# Patient Record
Sex: FEMALE | Race: WHITE | NOT HISPANIC OR LATINO | Employment: OTHER | URBAN - METROPOLITAN AREA
[De-identification: names, ages, dates, MRNs, and addresses within clinical notes are randomized per-mention and may not be internally consistent; named-entity substitution may affect disease eponyms.]

---

## 2022-04-12 ENCOUNTER — HOSPITAL ENCOUNTER (OUTPATIENT)
Dept: CT IMAGING | Facility: HOSPITAL | Age: 87
Discharge: HOME | End: 2022-04-12
Payer: COMMERCIAL

## 2022-04-12 ENCOUNTER — APPOINTMENT (OUTPATIENT)
Dept: LAB | Facility: HOSPITAL | Age: 87
End: 2022-04-12
Payer: COMMERCIAL

## 2022-04-12 ENCOUNTER — OFFICE VISIT (OUTPATIENT)
Dept: FAMILY MEDICINE CLINIC | Facility: CLINIC | Age: 87
End: 2022-04-12
Payer: COMMERCIAL

## 2022-04-12 VITALS
WEIGHT: 177 LBS | HEART RATE: 84 BPM | BODY MASS INDEX: 34.75 KG/M2 | SYSTOLIC BLOOD PRESSURE: 128 MMHG | DIASTOLIC BLOOD PRESSURE: 84 MMHG | HEIGHT: 60 IN

## 2022-04-12 DIAGNOSIS — H02.401 PTOSIS OF RIGHT EYELID: ICD-10-CM

## 2022-04-12 DIAGNOSIS — E78.2 MIXED HYPERLIPIDEMIA: ICD-10-CM

## 2022-04-12 DIAGNOSIS — H53.2 DIPLOPIA: ICD-10-CM
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
## 2022-04-12 DIAGNOSIS — H51.8 DYSCONJUGATE GAZE: ICD-10-CM

## 2022-04-12 DIAGNOSIS — I10 PRIMARY HYPERTENSION: ICD-10-CM

## 2022-04-12 DIAGNOSIS — H02.401 PTOSIS OF RIGHT EYELID: Primary | ICD-10-CM

## 2022-04-12 DIAGNOSIS — F41.9 ANXIETY: ICD-10-CM

## 2022-04-12 PROBLEM — H02.409 PTOSIS: Status: ACTIVE | Noted: 2022-01-01

## 2022-04-12 PROBLEM — E78.5 HYPERLIPIDEMIA: Status: ACTIVE | Noted: 2022-04-12

## 2022-04-12 LAB
ALBUMIN SERPL BCP-MCNC: 3.8 G/DL (ref 3.5–5)
ALP SERPL-CCNC: 83 U/L (ref 46–116)
ALT SERPL W P-5'-P-CCNC: 38 U/L (ref 12–78)
ANION GAP SERPL CALCULATED.3IONS-SCNC: 5 MMOL/L (ref 4–13)
AST SERPL W P-5'-P-CCNC: 18 U/L (ref 5–45)
BASOPHILS # BLD AUTO: 0.07 THOUSANDS/ΜL (ref 0–0.1)
BASOPHILS NFR BLD AUTO: 1 % (ref 0–1)
BILIRUB SERPL-MCNC: 0.46 MG/DL (ref 0.2–1)
BUN SERPL-MCNC: 26 MG/DL (ref 5–25)
CALCIUM SERPL-MCNC: 9.3 MG/DL (ref 8.3–10.1)
CHLORIDE SERPL-SCNC: 102 MMOL/L (ref 100–108)
CHOLEST SERPL-MCNC: 203 MG/DL
CO2 SERPL-SCNC: 30 MMOL/L (ref 21–32)
CREAT SERPL-MCNC: 0.95 MG/DL (ref 0.6–1.3)
EOSINOPHIL # BLD AUTO: 0 THOUSAND/ΜL (ref 0–0.61)
EOSINOPHIL NFR BLD AUTO: 0 % (ref 0–6)
ERYTHROCYTE [DISTWIDTH] IN BLOOD BY AUTOMATED COUNT: 13.7 % (ref 11.6–15.1)
GFR SERPL CREATININE-BSD FRML MDRD: 55 ML/MIN/1.73SQ M
GLUCOSE SERPL-MCNC: 86 MG/DL (ref 65–140)
HCT VFR BLD AUTO: 44 % (ref 34.8–46.1)
HDLC SERPL-MCNC: 76 MG/DL
HGB BLD-MCNC: 14.7 G/DL (ref 11.5–15.4)
IMM GRANULOCYTES # BLD AUTO: 0.06 THOUSAND/UL (ref 0–0.2)
IMM GRANULOCYTES NFR BLD AUTO: 1 % (ref 0–2)
LDLC SERPL CALC-MCNC: 111 MG/DL (ref 0–100)
LYMPHOCYTES # BLD AUTO: 2.11 THOUSANDS/ΜL (ref 0.6–4.47)
LYMPHOCYTES NFR BLD AUTO: 27 % (ref 14–44)
MCH RBC QN AUTO: 28.5 PG (ref 26.8–34.3)
MCHC RBC AUTO-ENTMCNC: 33.4 G/DL (ref 31.4–37.4)
MCV RBC AUTO: 85 FL (ref 82–98)
MONOCYTES # BLD AUTO: 0.68 THOUSAND/ΜL (ref 0.17–1.22)
MONOCYTES NFR BLD AUTO: 9 % (ref 4–12)
NEUTROPHILS # BLD AUTO: 4.86 THOUSANDS/ΜL (ref 1.85–7.62)
NEUTS SEG NFR BLD AUTO: 62 % (ref 43–75)
NRBC BLD AUTO-RTO: 0 /100 WBCS
PLATELET # BLD AUTO: 292 THOUSANDS/UL (ref 149–390)
PMV BLD AUTO: 9.6 FL (ref 8.9–12.7)
POTASSIUM SERPL-SCNC: 4.8 MMOL/L (ref 3.5–5.3)
PROT SERPL-MCNC: 7.6 G/DL (ref 6.4–8.2)
RBC # BLD AUTO: 5.15 MILLION/UL (ref 3.81–5.12)
SODIUM SERPL-SCNC: 137 MMOL/L (ref 136–145)
TRIGL SERPL-MCNC: 79 MG/DL
TSH SERPL DL<=0.05 MIU/L-ACNC: 0.9 UIU/ML (ref 0.45–4.5)
WBC # BLD AUTO: 7.78 THOUSAND/UL (ref 4.31–10.16)

## 2022-04-12 PROCEDURE — 36415 COLL VENOUS BLD VENIPUNCTURE: CPT

## 2022-04-12 PROCEDURE — 3288F FALL RISK ASSESSMENT DOCD: CPT | Performed by: FAMILY MEDICINE

## 2022-04-12 PROCEDURE — 99204 OFFICE O/P NEW MOD 45 MIN: CPT | Performed by: FAMILY MEDICINE

## 2022-04-12 PROCEDURE — 80061 LIPID PANEL: CPT

## 2022-04-12 PROCEDURE — 80053 COMPREHEN METABOLIC PANEL: CPT

## 2022-04-12 PROCEDURE — 70486 CT MAXILLOFACIAL W/O DYE: CPT

## 2022-04-12 PROCEDURE — 85025 COMPLETE CBC W/AUTO DIFF WBC: CPT

## 2022-04-12 PROCEDURE — 84443 ASSAY THYROID STIM HORMONE: CPT

## 2022-04-12 PROCEDURE — G1004 CDSM NDSC: HCPCS

## 2022-04-12 PROCEDURE — 1160F RVW MEDS BY RX/DR IN RCRD: CPT | Performed by: FAMILY MEDICINE

## 2022-04-12 PROCEDURE — 1100F PTFALLS ASSESS-DOCD GE2>/YR: CPT | Performed by: FAMILY MEDICINE

## 2022-04-12 PROCEDURE — 1036F TOBACCO NON-USER: CPT | Performed by: FAMILY MEDICINE

## 2022-04-12 PROCEDURE — 3725F SCREEN DEPRESSION PERFORMED: CPT | Performed by: FAMILY MEDICINE

## 2022-04-12 RX ORDER — AMLODIPINE BESYLATE 2.5 MG/1
2.5 TABLET ORAL DAILY
Start: 2022-04-12

## 2022-04-12 RX ORDER — CITALOPRAM 20 MG/1
20 TABLET ORAL DAILY
COMMUNITY

## 2022-04-12 RX ORDER — ATORVASTATIN CALCIUM 40 MG/1
40 TABLET, FILM COATED ORAL DAILY
COMMUNITY

## 2022-04-12 RX ORDER — AMLODIPINE BESYLATE 5 MG/1
5 TABLET ORAL DAILY
COMMUNITY
End: 2022-04-12 | Stop reason: SDUPTHER

## 2022-04-12 RX ORDER — MULTIVITAMIN
1 CAPSULE ORAL DAILY
COMMUNITY

## 2022-04-12 RX ORDER — VITAMIN E 268 MG
400 CAPSULE ORAL DAILY
COMMUNITY

## 2022-04-12 RX ORDER — VITAMIN B COMPLEX
1 CAPSULE ORAL DAILY
COMMUNITY

## 2022-04-12 NOTE — PATIENT INSTRUCTIONS
Ptosis   AMBULATORY CARE:   Ptosis  is the drooping of one or both eyelids  It may affect your vision  You may tilt your head back to see underneath the drooping eyelid  You may also try to lift your eyelids by raising your eyebrows  Contact your healthcare provider for the following symptoms:   · Worsening vision    · New symptoms    · Questions or concerns about your condition or care    Treatment for ptosis  may not be needed if your ptosis is mild  If your ptosis affects your vision, you may need surgery to tighten your levator muscle or to reattach it  If your levator muscle is too weak, your eyelid may be attached to or suspended from the area under your eyebrow  This will allow your forehead muscles to do the work of lifting your eyelid  Follow up with your doctor as directed:  Write down your questions so you remember to ask them during your visits  © [employer] 2022 Information is for End User's use only and may not be sold, redistributed or otherwise used for commercial purposes  All illustrations and images included in CareNotes® are the copyrighted property of A D A M , Inc  or [address] [place], [place] [address]carmen   The above information is an  only  It is not intended as medical advice for individual conditions or treatments  Talk to your doctor, nurse or pharmacist before following any medical regimen to see if it is safe and effective for you  Problem List Items Addressed This Visit     Anxiety     Patient has had a significant amount of anxiety overall  She has been using Celexa 20  At this time, I will not make any changes  Check labs, follow-up in the near future  Negative SI, positive contract  Relevant Orders    TSH, 3rd generation    CBC and differential    Diplopia     Patient is having diplopia, but she also has dysconjugate gaze  This brings up the possibility of muscular abnormalities such as myasthenia, verses tumor  Check labs, CT face, MRI brain    CT the face as she has had multiple falls as well, therefore I would like to check see if there is a facial fracture  Relevant Orders    TSH, 3rd generation    Comprehensive metabolic panel    CT facial bones wo contrast    MRI brain w wo contrast    Dysconjugate gaze     Patient was not able have normal extraocular muscle movements today on exam   The right eye was not staying in time with the left  This is also where the tumor changes may be, as well as the ptosis and issues that she appears to be having with diplopia  Studies as ordered  Relevant Orders    TSH, 3rd generation    CT facial bones wo contrast    MRI brain w wo contrast    Falls     Patient has had multiple falls  Question cause  Check labs  Need to rule out CVA, tumor  May be related to eyes, but could also be other  Given that she has amlodipine for blood pressure, will check orthostatics as well  Relevant Orders    CT facial bones wo contrast    MRI brain w wo contrast    Hyperlipidemia     Patient has history of hyperlipidemia  Recommend check labs  Follow-up afterwards  Continue on atorvastatin for the time being  She is also on Co Q10  Relevant Medications    atorvastatin (LIPITOR) 40 mg tablet    Other Relevant Orders    Comprehensive metabolic panel    Lipid Panel with Direct LDL reflex    Hypertension     When patient went from lying to sitting or standing, her blood pressure did drop  With that, would recommend that he decrease the amlodipine to 2 5 mg   I did ask if she felt any dizziness or lightheadedness when happened, and her reply was I did it slowly           Relevant Medications    amLODIPine (NORVASC) 2 5 mg tablet    Other Relevant Orders    TSH, 3rd generation    CBC and differential    Comprehensive metabolic panel    Ptosis - Primary     Patient clearly has significant ptosis of the right eye  At this point, I would check labs, CT of the face, MRI of the brain    Will need to rule mass verses other issues  Relevant Orders    TSH, 3rd generation    Comprehensive metabolic panel    CT facial bones wo contrast    MRI brain w wo contrast            COVID 19 Instructions    Susanna Carter was advised to limit contact with others to essential tasks such as getting food, medications, and medical care  Proper handwashing reviewed, and Hand sanitzer when washing is not available  If the patient develops symptoms of COVID 19, the patient should call the office as soon as possible  For 5712-7833 Flu season, it is strongly recommended that Flu Vaccinations be obtained  Virtual Visits:  Jerrell: This works on smart phones (any phone with Internet browsing capability)  You should get a text message when the provider is ready to see you  Click on the link in the text message, and the call should start  You will need to type in your name, and allow camera and microphone access  This is HIPPA compliant, and secure  If you have not already done so, get immunized to COVID 19  We are committed to getting you vaccinated as soon as possible and will be closely following CDC and [facility]  guidelines as they are released and revised  Please refer to our COVID-19 vaccine webpage for the most up to date information on the vaccine and our distribution efforts  This site will also have the most up to date recommendations for COVID booster vaccine  Sweta casey    Call [phone] ([phone]), option 7    OUR NEW LOCATION:    [address], [address] [place] [address] [address], [place], [address]  Fax: [phone]    Lab services and OB/GYN are at this location as well

## 2022-04-12 NOTE — ASSESSMENT & PLAN NOTE
Patient has had multiple falls  Question cause  Check labs  Need to rule out CVA, tumor  May be related to eyes, but could also be other  Given that she has amlodipine for blood pressure, will check orthostatics as well

## 2022-04-12 NOTE — ASSESSMENT & PLAN NOTE
When patient went from lying to sitting or standing, her blood pressure did drop  With that, would recommend that he decrease the amlodipine to 2 5 mg   I did ask if she felt any dizziness or lightheadedness when happened, and her reply was I did it slowly  

## 2022-04-12 NOTE — ASSESSMENT & PLAN NOTE
Patient has had a significant amount of anxiety overall  She has been using Celexa 20  At this time, I will not make any changes  Check labs, follow-up in the near future  Negative SI, positive contract

## 2022-04-12 NOTE — ASSESSMENT & PLAN NOTE
Patient clearly has significant ptosis of the right eye  At this point, I would check labs, CT of the face, MRI of the brain  Will need to rule mass verses other issues

## 2022-04-12 NOTE — PROGRESS NOTES
Assessment and Plan:    Problem List Items Addressed This Visit     Anxiety     Patient has had a significant amount of anxiety overall  She has been using Celexa 20  At this time, I will not make any changes  Check labs, follow-up in the near future  Negative SI, positive contract  Relevant Orders    TSH, 3rd generation    CBC and differential    Diplopia     Patient is having diplopia, but she also has dysconjugate gaze  This brings up the possibility of muscular abnormalities such as myasthenia, verses tumor  Check labs, CT face, MRI brain  CT the face as she has had multiple falls as well, therefore I would like to check see if there is a facial fracture  Relevant Orders    TSH, 3rd generation    Comprehensive metabolic panel    CT facial bones wo contrast    MRI brain w wo contrast    Dysconjugate gaze     Patient was not able have normal extraocular muscle movements today on exam   The right eye was not staying in time with the left  This is also where the tumor changes may be, as well as the ptosis and issues that she appears to be having with diplopia  Studies as ordered  Relevant Orders    TSH, 3rd generation    CT facial bones wo contrast    MRI brain w wo contrast    Falls     Patient has had multiple falls  Question cause  Check labs  Need to rule out CVA, tumor  May be related to eyes, but could also be other  Given that she has amlodipine for blood pressure, will check orthostatics as well  Relevant Orders    CT facial bones wo contrast    MRI brain w wo contrast    Hyperlipidemia     Patient has history of hyperlipidemia  Recommend check labs  Follow-up afterwards  Continue on atorvastatin for the time being  She is also on Co Q10           Relevant Medications    atorvastatin (LIPITOR) 40 mg tablet    Other Relevant Orders    Comprehensive metabolic panel    Lipid Panel with Direct LDL reflex    Hypertension     When patient went from lying to sitting or standing, her blood pressure did drop  With that, would recommend that he decrease the amlodipine to 2 5 mg   I did ask if she felt any dizziness or lightheadedness when happened, and her reply was I did it slowly           Relevant Medications    amLODIPine (NORVASC) 2 5 mg tablet    Other Relevant Orders    TSH, 3rd generation    CBC and differential    Comprehensive metabolic panel    Ptosis - Primary     Patient clearly has significant ptosis of the right eye  At this point, I would check labs, CT of the face, MRI of the brain  Will need to rule mass verses other issues  Relevant Orders    TSH, 3rd generation    Comprehensive metabolic panel    CT facial bones wo contrast    MRI brain w wo contrast                 Diagnoses and all orders for this visit:    Ptosis of right eyelid  -     TSH, 3rd generation; Future  -     Comprehensive metabolic panel; Future  -     CT facial bones wo contrast; Future  -     MRI brain w wo contrast; Future    Diplopia  -     TSH, 3rd generation; Future  -     Comprehensive metabolic panel; Future  -     CT facial bones wo contrast; Future  -     MRI brain w wo contrast; Future    Primary hypertension  -     TSH, 3rd generation; Future  -     CBC and differential; Future  -     Comprehensive metabolic panel; Future  -     amLODIPine (NORVASC) 2 5 mg tablet; Take 1 tablet (2 5 mg total) by mouth daily    Mixed hyperlipidemia  -     Comprehensive metabolic panel; Future  -     Lipid Panel with Direct LDL reflex; Future    Anxiety  -     TSH, 3rd generation; Future  -     CBC and differential; Future    Dysconjugate gaze  -     TSH, 3rd generation; Future  -     CT facial bones wo contrast; Future  -     MRI brain w wo contrast; Future    Fall, initial encounter  -     CT facial bones wo contrast; Future  -     MRI brain w wo contrast; Future    Other orders  -     citalopram (CeleXA) 20 mg tablet;  Take 20 mg by mouth daily  -     Discontinue: amLODIPine (NORVASC) 5 mg tablet; Take 5 mg by mouth daily  -     atorvastatin (LIPITOR) 40 mg tablet; Take 40 mg by mouth daily  -     Multiple Vitamin (multivitamin) capsule; Take 1 capsule by mouth daily  -     b complex vitamins capsule; Take 1 capsule by mouth daily  -     co-enzyme Q-10 30 MG capsule; Take 30 mg by mouth 3 (three) times a day  -     vitamin E, tocopherol, 400 units capsule; Take 400 Units by mouth daily              Subjective:      Patient ID: Ej Welsh is a 82 y o  female  CC:    Chief Complaint   Patient presents with   [address]     NEW patient is here to get established  Patient is having difficulty with her vision in her right eye, eye dr diagnosed with Diplopia x 2 weeks, Ptosis  ak       HPI:    Patient here to [address] [place] [address]  She is having some issues with double vision  Has had some blurred vision as well  Patient did go to optometrist   They recommended further evaluation  This is because of sudden onset of diplopia, and ptosis  She had cataract on right, and since had problems in the right eye  Notes some swelling in the face as well  Has been for a bit with this  "Many years "    Dizziness as well  She stood up quick, and noted spinning and then needed to sit  Has had several falls with this, "loose my balance just like that "    Patient does have hypertension  She is currently on amlodipine 5 mg daily  She reports she has been on this for approximately 10 years  Hyperlipidemia:  Currently on atorvastatin  She has been that for approximately 15 years per    Anxiety:  Patient reports he has significant amount of anxiety  She gets frequently panic symptoms  Has difficulty calming herself down at times  She is currently on Celexa at 20 mg          The following portions of the patient's history were reviewed and updated as appropriate: allergies, current medications, past family history, past medical history, past social history, past surgical history and problem list       Review of Systems   Constitutional: Negative  HENT: Positive for congestion and sinus pressure  Eyes: Positive for photophobia (od), pain (Started about a month ago ) and visual disturbance  Respiratory: Positive for shortness of breath and wheezing  Cardiovascular: Positive for palpitations  Negative for chest pain and leg swelling  Gastrointestinal: Negative  Endocrine: Negative  Genitourinary: Negative  Musculoskeletal: Positive for arthralgias and back pain  Skin: Negative  Allergic/Immunologic: Negative  Neurological: Positive for dizziness and light-headedness  Negative for speech difficulty  Hematological: Negative  Psychiatric/Behavioral: Negative for suicidal ideas  The patient is nervous/anxious            Data to review:       Objective:    Vitals:    04/12/22 1607 04/12/22 1709 04/12/22 1710   BP: 144/84 120/84 128/84   BP Location: Right arm     Patient Position: Prone     Pulse: 84     Weight: 80 3 kg (177 lb)     Height: 4' 11 7" (1 516 m)          Physical Exam

## 2022-04-12 NOTE — ASSESSMENT & PLAN NOTE
Patient was not able have normal extraocular muscle movements today on exam   The right eye was not staying in time with the left  This is also where the tumor changes may be, as well as the ptosis and issues that she appears to be having with diplopia  Studies as ordered

## 2022-04-12 NOTE — ASSESSMENT & PLAN NOTE
Patient has history of hyperlipidemia  Recommend check labs  Follow-up afterwards  Continue on atorvastatin for the time being  She is also on Co Q10

## 2022-04-13 ENCOUNTER — TELEPHONE (OUTPATIENT)
Dept: FAMILY MEDICINE CLINIC | Facility: CLINIC | Age: 87
End: 2022-04-13

## 2022-04-13 PROBLEM — H05.89 ORBITAL MASS: Status: ACTIVE | Noted: 2022-01-01

## 2022-04-13 PROBLEM — H05.20 PROPTOSIS: Status: ACTIVE | Noted: 2022-01-01

## 2022-04-13 NOTE — TELEPHONE ENCOUNTER
Pt's granddaughter is calling in; they did schedule the mri brain and orbits wo and w for 4/25 at [facility]; this was the first available apt for this kind of MRI  Pt's granddaughter is asking if Dr Gladys Dumont can possibly change this to a stat mri? So they can try to get in sooner? Please call her back at [phone]  Thank you

## 2022-04-14 NOTE — TELEPHONE ENCOUNTER
Spoke w/pt granddaughter  pt is having some discomfort, she understood dr joel wasn't unable to change MRI order to ASAP and they going to keep the MRI appt that is already scheduled

## 2022-04-15 ENCOUNTER — TELEPHONE (OUTPATIENT)
Dept: FAMILY MEDICINE CLINIC | Facility: CLINIC | Age: 87
End: 2022-04-15

## 2022-04-15 ENCOUNTER — HOSPITAL ENCOUNTER (OUTPATIENT)
Dept: MRI IMAGING | Facility: HOSPITAL | Age: 87
Discharge: HOME | End: 2022-04-15
Payer: COMMERCIAL

## 2022-04-15 DIAGNOSIS — H05.20 PROPTOSIS: ICD-10-CM

## 2022-04-15 DIAGNOSIS — H05.89 ORBITAL MASS: ICD-10-CM

## 2022-04-15 PROCEDURE — A9585 GADOBUTROL INJECTION: HCPCS | Performed by: FAMILY MEDICINE

## 2022-04-15 PROCEDURE — 70543 MRI ORBT/FAC/NCK W/O &W/DYE: CPT

## 2022-04-15 PROCEDURE — G1004 CDSM NDSC: HCPCS

## 2022-04-15 PROCEDURE — 70553 MRI BRAIN STEM W/O & W/DYE: CPT

## 2022-04-15 RX ADMIN — GADOBUTROL 8 ML: 604.72 INJECTION INTRAVENOUS at 10:54

## 2022-04-15 NOTE — TELEPHONE ENCOUNTER
Reviewed the MRI results with her son  Reviewed that she has an appointment on Tuesday with ENT  Further recommendations will be per ENT and Surgical teams

## 2022-04-15 NOTE — TELEPHONE ENCOUNTER
Patient son Wilian Keller called in to get MRI results, per son can provider review results and nurse call him with results, please call aye Keller first before patient

## 2022-04-15 NOTE — TELEPHONE ENCOUNTER
Son stated to [facility] he wants more clarification on his mothers MRI and does not want to bring her in if he doesn't have to

## 2022-04-18 ENCOUNTER — TELEPHONE (OUTPATIENT)
Dept: FAMILY MEDICINE CLINIC | Facility: CLINIC | Age: 87
End: 2022-04-18

## 2022-04-18 NOTE — TELEPHONE ENCOUNTER
PT'S STAT CT FACIAL BONES WO CONTRAST WAS DENIED BY THE INSURANCE  IS DR BRAND WILLING TO DO A PEER TO PEER TO HAVE THIS POSSIBLY APPROVED?          CT DENIED Jed Menendez Case #[id number] Jed Ovidioesthela recommended CPT 16143 but patient was already scanned for STAT 61843 / Sent IB requesting P2P to [facility] PRIMARY CARE Clinical Team     Authorization Number:            NA  Case Number: [id number]                  Status: Denied  P2P Status:          Approval Date:              Service Code: [id number]  Service Description:    Mary Alice Cannon 541 W/O CONTRAST  Site Name:      [facility] [place]  Expiration Date:            Date Last Updated:     4/14/2022 8:27:29 AM  Correspondence:           Procedures  Procedure       Description      Magnolia Reynolds Requested            Qty Approved  Modifier(s)  17051  Computed tomography (CT), a special kind of picture of your maxillofacial area (sinus) wi  thout contrast (dye)            1

## 2022-04-18 NOTE — TELEPHONE ENCOUNTER
OK for prior auth call  I did use ACR criteria for this, and it was approved under ACR criteria  Concern was orbital fx and entrapment of the muscles of the eye, as she has fallen frequently, and had diplopia, proptosis, and blurry vision after that

## 2022-04-19 NOTE — H&P (VIEW-ONLY)
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  We discussed extensively the imaging and reviewed with her as well as with radiology  We discussed that 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 cavity as well as throughout both paranasal sinus in the ethmoids and right frontal sinus  She does have orbital involvement as well  Discussed with my colleague Dr Liu Felix and evaluated the images with him  We discussed that this will require likely complex management with neurosurgical involvement, ENT involvement, and possible medical or radiation oncology involvement  We discussed her age and overall health and that initial workup will start with a nasal sinus mass biopsy to determine the nature of the mass  She was poorly tolerant of the nasal endoscopy due to anxiety  We will plan for operative biopsy and follow-up shortly thereafter  Once we have established pathology we will refer her to my colleague Dr Angela Burnham should she wish surgical management or radiation And/or medical oncology for nonsurgical management  Discussed RBA of nasal endoscopy and biopsy including risk of bleeding, CSF leak and lack of diagnostic information and need for further procedures  History of Present Illness   Physician Requesting Consult: Edison Vigil MD   Reason for Consult / Principal Problem: Orbital mass  HPI: Karmen Cuellar is a 82y o  year old female who presents with Long history of orbital changes    She was seen by her ophthalmologist previously when she noted some degree of proptosis  She has noted a long-term history of right-sided neck and temple pain lasting several years  She was thought to have an orbital mass and her ophthalmologist referred her for an MRI  She was lost to follow-up at that time and her son who acts as an adjunct historian moved her to the area  She was seen by her new PCP, Dr Michael Granados who immediately obtained a CT scan followed by an MRI which demonstrated a 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 extending into the frontal sinus on the right across midline to the ethmoid sinuses on the left and through the skull base into the intracranial cavity  Notably there is some dural enhancement on the MRI and Likely reactive sphenoid sinusitis on the CT scan  Review of systems:  10 Point ROS was performed and negative except as above or otherwise noted in the medical record  Historical Information   Past Medical History:   Diagnosis Date    Anemia     Anxiety     Arthritis     Hypertension      Past Surgical History:   Procedure Laterality Date    CATARACT EXTRACTION Bilateral     Left was OK, right with vision loss after      TONSILECTOMY AND ADNOIDECTOMY      TOTAL HIP ARTHROPLASTY Bilateral     WISDOM TOOTH EXTRACTION       Social History   Social History     Substance and Sexual Activity   Alcohol Use Yes    Comment: occasional     Social History     Substance and Sexual Activity   Drug Use Never     Social History     Tobacco Use   Smoking Status Never Smoker   Smokeless Tobacco Never Used     Family History:   Family History   Adopted: Yes   Problem Relation Age of Onset    Hypertension Family     Arthritis Family        Current Outpatient Medications on File Prior to Visit   Medication Sig    amLODIPine (NORVASC) 2 5 mg tablet Take 1 tablet (2 5 mg total) by mouth daily    atorvastatin (LIPITOR) 40 mg tablet Take 40 mg by mouth daily    citalopram (CeleXA) 20 mg tablet Take 20 mg by mouth daily    b complex vitamins capsule Take 1 capsule by mouth daily    co-enzyme Q-10 30 MG capsule Take 30 mg by mouth 3 (three) times a day    Multiple Vitamin (multivitamin) capsule Take 1 capsule by mouth daily    vitamin E, tocopherol, 400 units capsule Take 400 Units by mouth daily     No current facility-administered medications on file prior to visit  Allergies   Allergen Reactions    Sm Non-Asprin Sinus [Pseudoephedrine-Acetaminophen] Anaphylaxis    Prednisone Edema    Penicillins Rash       Vitals:    04/19/22 1102   Temp: (!) 97 °F (36 1 °C)       Physical Exam   Constitutional: Oriented to person, place, and time  Well-developed and well-nourished, no apparent distress, non-toxic appearance  Cooperative, able to hear and answer questions without difficulty  Voice: Normal voice quality  Head: Normocephalic, atraumatic  No scars, masses or lesions  Face: Symmetric, no edema, no sinus tenderness  Eyes: Vision grossly intact, extra-ocular movement intact  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 and diplopia  She compensates by turning her head to the right and inferior slightly resulting in right SCM and trapezius tension  Ears: External ears normal  Tympanic membranes intact with intact normal landmarks  No post-auricular erythema or tenderness  Nose: Septum intact, nares clear  Mucosa moist, turbinates well appearing  No crusting, polyps or discharge evident  Oral cavity: Dentition intact  Mucosa moist, lips without lesions or masses  Tongue mobile, floor of mouth soft and flat  Hard palate intact  No masses or lesions  Oropharynx: Uvula is midline, soft palate intact without lesion or mass  Oropharyngeal inlet without obstruction  Tonsils unremarkable  Posterior pharyngeal wall clear  No masses or lesions    Salivary glands:  Parotid glands and submandibular glands symmetric, no enlargement or tenderness  Neck: Normal laryngeal elevation with swallow  Trachea midline  No masses or lesions  No palpable adenopathy  Thyroid: Without tenderness or palpable nodules  Pulmonary/Chest: Normal effort and rate  No respiratory distress  No stertor or stridor  Musculoskeletal: Normal range of motion  Neurological: Cranial nerves 2-12 intact  Skin: Skin is warm and dry  Psychiatric: Normal mood and affect  Procedure:  Nasal endoscopy    Indications:  Evaluation for sinusitis    Procedure in detail:  After informed verbal consent was obtained from the patient bilateral nares were anesthetized with 1% lidocaine and oxymetazoline spray  After adequate time for anesthesia patient's bilateral nasal cavities, middle meatus, and sphenoethmoid recesses were evaluated with the following findings  Patient tolerated the procedure well without complications  Findings:   No significant mucoid purulence or polyposis seen in bilateral nasal cavities  There is mild right septal deviation and moderate congestion  She was poorly tolerant of the exam        Imaging Studies: I have personally reviewed pertinent reports   , I have personally reviewed pertinent films in PACS with a Radiologist  and Reviewed with Drs Manon Sever, and Berta as well  Lab Results: I have personally reviewed pertinent lab results  Records reviewed: Dr Vivian Miranda records reviewed  Over 1/2 of the 60 minute visit was spent in medical-decision making, counseling, and coordination of care

## 2022-04-22 ENCOUNTER — ANESTHESIA EVENT (OUTPATIENT)
Dept: PERIOP | Facility: AMBULARY SURGERY CENTER | Age: 87
End: 2022-04-22
Payer: COMMERCIAL

## 2022-04-27 NOTE — PRE-PROCEDURE INSTRUCTIONS
Pre-Surgery Instructions:   Medication Instructions    albuterol (PROVENTIL HFA,VENTOLIN HFA) 90 mcg/act inhaler Uses PRN- OK to take day of surgery    amLODIPine (NORVASC) 2 5 mg tablet Take day of surgery   Apoaequorin (PREVAGEN PO) Hold day of surgery   atorvastatin (LIPITOR) 40 mg tablet Take day of surgery   b complex vitamins capsule Pt reports has stopped since Monday 4/25 for surgery     citalopram (CeleXA) 20 mg tablet Take day of surgery   co-enzyme Q-10 30 MG capsule Pt reports has stopped since Monday 4/25/22 for surgery     Homeopathic Products [facility] COLD REMEDY PO) Instructed to HOLD today up to and including DOS per anes guidelines     Multiple Vitamin (multivitamin) capsule Pt reports has stopped since Monday 4/25 for surgery     vitamin E, tocopherol, 400 units capsule Pt reports stopped since 4/25/22 for surgery      Reviewed all medications and instructions for DOS  Reviewed all showering instructions and COVID visitation policy  Pt aware that [facility]  is location for [facility], instructed that pt pre op nurse will call on 4/27/22  to give specific instructions for DOS  Pt instructed to bring photo ID and insurance card for DOS, remove all jewelry and  NO valuables for DOS  Pt instructed to use only Tylenol between now 4/27/22 and DOS, NO NSAID products  Pt informed transport is needed for DOS due to receiving anesthesia  Pt verbalized understanding of all instructions given and reviewed for DOS  Pt is fully vaccinated for COVID   My Surgical Experience    The following information was developed to assist you to prepare for your operation  What do I need to do before coming to the hospital?   Arrange for a responsible person to drive you to and from the hospital    Arrange care for your children at home  Children are not allowed in the recovery areas of the hospital   Plan to wear clothing that is easy to put on and take off   If you are having shoulder surgery, wear a shirt that buttons or zippers in the front  Bathing  o Shower the evening before and the morning of your surgery with an antibacterial soap  Please refer to the Pre Op Showering Instructions for Surgery Patients Sheet   o Remove nail polish and all body piercing jewelry  o Do not shave any body part for at least 24 hours before surgery-this includes face, arms, legs and upper body  Food  o Nothing to eat or drink after midnight the night before your surgery  This includes candy and chewing gum  o Exception: If your surgery is after 12:00pm (noon), you may have clear liquids such as 7-Up®, ginger ale, apple or cranberry juice, Jell-O®, water, or clear broth until 8:00 am  o Do not drink milk or juice with pulp on the morning before surgery  o Do not drink alcohol 24 hours before surgery  Medicine  o Follow instructions you received from your surgeon about which medicines you may take on the day of surgery  o If instructed to take medicine on the morning of surgery, take pills with just a small sip of water  Call your prescribing doctor for specific infroamtion on what to do if you take insulin    What should I bring to the hospital?    Bring:  Johnna Hutton or a walker, if you have them, for foot or knee surgery   A list of the daily medicines, vitamins, minerals, herbals and nutritional supplements you take  Include the dosages of medicines and the time you take them each day   Glasses, dentures or hearing aids   Minimal clothing; you will be wearing hospital sleepwear   Photo ID; required to verify your identity   If you have a Living Will or Power of , bring a copy of the documents   If you have an ostomy, bring an extra pouch and any supplies you use    Do not bring   Medicines or inhalers   Money, valuables or jewelry    What other information should I know about the day of surgery?  Notify your surgeons if you develop a cold, sore throat, cough, fever, rash or any other illness     Report to the Ambulatory Surgical/Same Day Surgery Unit   You will be instructed to stop at Registration only if you have not been pre-registered   Inform your  fi they do not stay that they will be asked by the staff to leave a phone number where they can be reached   Be available to be reached before surgery  In the event the operating room schedule changes, you may be asked to come in earlier or later than expected    *It is important to tell your doctor and others involved in your health care if you are taking or have been taking any non-prescription drugs, vitamins, minerals, herbals or other nutritional supplements   Any of these may interact with some food or medicines and cause a reaction

## 2022-04-28 ENCOUNTER — ANESTHESIA (OUTPATIENT)
Dept: PERIOP | Facility: AMBULARY SURGERY CENTER | Age: 87
End: 2022-04-28
Payer: COMMERCIAL

## 2022-04-28 ENCOUNTER — HOSPITAL ENCOUNTER (OUTPATIENT)
Facility: AMBULARY SURGERY CENTER | Age: 87
Setting detail: OUTPATIENT SURGERY
Discharge: HOME | End: 2022-04-28
Attending: OTOLARYNGOLOGY | Admitting: OTOLARYNGOLOGY
Payer: COMMERCIAL

## 2022-04-28 VITALS
RESPIRATION RATE: 20 BRPM | HEIGHT: 60 IN | HEART RATE: 64 BPM | OXYGEN SATURATION: 94 % | WEIGHT: 176 LBS | SYSTOLIC BLOOD PRESSURE: 167 MMHG | DIASTOLIC BLOOD PRESSURE: 78 MMHG | TEMPERATURE: 97.4 F | BODY MASS INDEX: 34.55 KG/M2

## 2022-04-28 DIAGNOSIS — J34.89 NASAL CAVITY MASS: Primary | ICD-10-CM

## 2022-04-28 DIAGNOSIS — H05.20 PROPTOSIS: ICD-10-CM

## 2022-04-28 DIAGNOSIS — H05.89 ORBITAL MASS: ICD-10-CM

## 2022-04-28 PROCEDURE — 31237 NSL/SINS NDSC SURG BX POLYPC: CPT | Performed by: OTOLARYNGOLOGY

## 2022-04-28 PROCEDURE — 88342 IMHCHEM/IMCYTCHM 1ST ANTB: CPT | Performed by: PATHOLOGY

## 2022-04-28 PROCEDURE — 88311 DECALCIFY TISSUE: CPT | Performed by: PATHOLOGY

## 2022-04-28 PROCEDURE — 88305 TISSUE EXAM BY PATHOLOGIST: CPT | Performed by: PATHOLOGY

## 2022-04-28 PROCEDURE — 88341 IMHCHEM/IMCYTCHM EA ADD ANTB: CPT | Performed by: PATHOLOGY

## 2022-04-28 RX ORDER — SODIUM CHLORIDE 9 MG/ML
INJECTION, SOLUTION INTRAVENOUS AS NEEDED
Status: DISCONTINUED | OUTPATIENT
Start: 2022-04-28 | End: 2022-04-28 | Stop reason: HOSPADM

## 2022-04-28 RX ORDER — LIDOCAINE HYDROCHLORIDE 10 MG/ML
INJECTION, SOLUTION EPIDURAL; INFILTRATION; INTRACAUDAL; PERINEURAL AS NEEDED
Status: DISCONTINUED | OUTPATIENT
Start: 2022-04-28 | End: 2022-04-28

## 2022-04-28 RX ORDER — OXYCODONE HCL 5 MG/5 ML
5 SOLUTION, ORAL ORAL EVERY 4 HOURS PRN
Status: DISCONTINUED | OUTPATIENT
Start: 2022-04-28 | End: 2022-04-28 | Stop reason: HOSPADM

## 2022-04-28 RX ORDER — ONDANSETRON 2 MG/ML
INJECTION INTRAMUSCULAR; INTRAVENOUS AS NEEDED
Status: DISCONTINUED | OUTPATIENT
Start: 2022-04-28 | End: 2022-04-28

## 2022-04-28 RX ORDER — OXYCODONE HYDROCHLORIDE 5 MG/1
5 TABLET ORAL EVERY 4 HOURS PRN
Qty: 5 TABLET | Refills: 0 | Status: SHIPPED | OUTPATIENT
Start: 2022-04-28 | End: 2022-05-08

## 2022-04-28 RX ORDER — PROPOFOL 10 MG/ML
INJECTION, EMULSION INTRAVENOUS AS NEEDED
Status: DISCONTINUED | OUTPATIENT
Start: 2022-04-28 | End: 2022-04-28

## 2022-04-28 RX ORDER — FENTANYL CITRATE 50 UG/ML
INJECTION, SOLUTION INTRAMUSCULAR; INTRAVENOUS AS NEEDED
Status: DISCONTINUED | OUTPATIENT
Start: 2022-04-28 | End: 2022-04-28

## 2022-04-28 RX ORDER — ACETAMINOPHEN 325 MG/1
650 TABLET ORAL EVERY 6 HOURS PRN
Status: DISCONTINUED | OUTPATIENT
Start: 2022-04-28 | End: 2022-04-28 | Stop reason: HOSPADM

## 2022-04-28 RX ORDER — MIDAZOLAM HYDROCHLORIDE 2 MG/2ML
INJECTION, SOLUTION INTRAMUSCULAR; INTRAVENOUS AS NEEDED
Status: DISCONTINUED | OUTPATIENT
Start: 2022-04-28 | End: 2022-04-28

## 2022-04-28 RX ORDER — LIDOCAINE HYDROCHLORIDE AND EPINEPHRINE 10; 10 MG/ML; UG/ML
INJECTION, SOLUTION INFILTRATION; PERINEURAL AS NEEDED
Status: DISCONTINUED | OUTPATIENT
Start: 2022-04-28 | End: 2022-04-28 | Stop reason: HOSPADM

## 2022-04-28 RX ORDER — EPHEDRINE SULFATE 50 MG/ML
INJECTION INTRAVENOUS AS NEEDED
Status: DISCONTINUED | OUTPATIENT
Start: 2022-04-28 | End: 2022-04-28

## 2022-04-28 RX ORDER — SODIUM CHLORIDE, SODIUM LACTATE, POTASSIUM CHLORIDE, CALCIUM CHLORIDE 600; 310; 30; 20 MG/100ML; MG/100ML; MG/100ML; MG/100ML
50 INJECTION, SOLUTION INTRAVENOUS CONTINUOUS
Status: DISCONTINUED | OUTPATIENT
Start: 2022-04-28 | End: 2022-04-28 | Stop reason: HOSPADM

## 2022-04-28 RX ORDER — OXYMETAZOLINE HYDROCHLORIDE 0.05 G/100ML
SPRAY NASAL AS NEEDED
Status: DISCONTINUED | OUTPATIENT
Start: 2022-04-28 | End: 2022-04-28 | Stop reason: HOSPADM

## 2022-04-28 RX ORDER — SODIUM CHLORIDE, SODIUM LACTATE, POTASSIUM CHLORIDE, CALCIUM CHLORIDE 600; 310; 30; 20 MG/100ML; MG/100ML; MG/100ML; MG/100ML
INJECTION, SOLUTION INTRAVENOUS CONTINUOUS PRN
Status: DISCONTINUED | OUTPATIENT
Start: 2022-04-28 | End: 2022-04-28

## 2022-04-28 RX ORDER — FENTANYL CITRATE/PF 50 MCG/ML
25 SYRINGE (ML) INJECTION
Status: COMPLETED | OUTPATIENT
Start: 2022-04-28 | End: 2022-04-28

## 2022-04-28 RX ADMIN — ACETAMINOPHEN 650 MG: 325 TABLET ORAL at 11:00

## 2022-04-28 RX ADMIN — MIDAZOLAM HYDROCHLORIDE 1 MG: 1 INJECTION, SOLUTION INTRAMUSCULAR; INTRAVENOUS at 08:51

## 2022-04-28 RX ADMIN — PROPOFOL 40 MG: 10 INJECTION, EMULSION INTRAVENOUS at 08:52

## 2022-04-28 RX ADMIN — FENTANYL CITRATE 25 MCG: 50 INJECTION, SOLUTION INTRAMUSCULAR; INTRAVENOUS at 08:59

## 2022-04-28 RX ADMIN — SODIUM CHLORIDE, SODIUM LACTATE, POTASSIUM CHLORIDE, AND CALCIUM CHLORIDE: .6; .31; .03; .02 INJECTION, SOLUTION INTRAVENOUS at 08:47

## 2022-04-28 RX ADMIN — MIDAZOLAM HYDROCHLORIDE 1 MG: 1 INJECTION, SOLUTION INTRAMUSCULAR; INTRAVENOUS at 08:47

## 2022-04-28 RX ADMIN — PROPOFOL 100 MG: 10 INJECTION, EMULSION INTRAVENOUS at 08:51

## 2022-04-28 RX ADMIN — FENTANYL CITRATE 25 MCG: 50 INJECTION INTRAMUSCULAR; INTRAVENOUS at 09:48

## 2022-04-28 RX ADMIN — LIDOCAINE HYDROCHLORIDE 50 MG: 10 INJECTION, SOLUTION EPIDURAL; INFILTRATION; INTRACAUDAL at 08:51

## 2022-04-28 RX ADMIN — ONDANSETRON 4 MG: 2 INJECTION INTRAMUSCULAR; INTRAVENOUS at 09:12

## 2022-04-28 RX ADMIN — EPHEDRINE SULFATE 5 MG: 50 INJECTION, SOLUTION INTRAVENOUS at 09:03

## 2022-04-28 RX ADMIN — FENTANYL CITRATE 25 MCG: 50 INJECTION INTRAMUSCULAR; INTRAVENOUS at 09:40

## 2022-04-28 RX ADMIN — EPHEDRINE SULFATE 5 MG: 50 INJECTION, SOLUTION INTRAVENOUS at 09:02

## 2022-04-28 NOTE — INTERVAL H&P NOTE
H&P reviewed  After examining the patient I find no changes in the patients condition since the H&P had been written      Vitals:    04/28/22 0743   BP: 143/68   Pulse: 66   Resp: 16   Temp: (!) 97 4 °F (36 3 °C)   SpO2: 96%   CTAB  RRR  ABd soft NTND

## 2022-04-28 NOTE — ANESTHESIA POSTPROCEDURE EVALUATION
Post-Op Assessment Note    CV Status:  Stable    Pain management: adequate     Mental Status:  Alert and awake   Hydration Status:  Euvolemic   PONV Controlled:  Controlled   Airway Patency:  Patent      Post Op Vitals Reviewed: Yes      Staff: CRNA         No complications documented      BP  185/85   Temp (!) 97 4 °F (36 3 °C) (04/28/22 0959)    Pulse 66 (04/28/22 0959)   Resp 16 (04/28/22 0959)    SpO2 92 % (04/28/22 0959)

## 2022-04-28 NOTE — OP NOTE
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]    Procedure(s) (LRB):  NASAL ENDOSCOPY AND BIOPSY (N/A)    Specimen(s):  ID Type Source Tests Collected by Time Destination   1 : Right ethmoid sinus mass Tissue Soft Tissue, Other TISSUE EXAM Alex Kohler MD 2022  9:08 AM        Estimated Blood Loss:   Minimal    Drains:  * No LDAs found *    Anesthesia Type:   General    Operative Indications:  Proptosis [H05 20]  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]  Right Ethmoid cavity mass    Operative Findings:  Large right nasal cavity and ethmoid mass    Complications:   None    Procedure and Technique:  Indications: This is a 82y o  year old female whom I have seen in consultation for the above-listed procedure  After discussion of risks, benefits, and alternatives the patient elected to undergo the procedure and informed consent was obtained  Procedure in detail:     The patient was brought back to the operating room laid in the supine position and general endotracheal anesthesia was administered  The patient was positioned appropriately  Appropriate time-out was taken and procedure, sidedness, and marking was confirmed  2 mL of 1% lidocaine with 1:100,000 epinephrine was infiltrated into the marked area  The patient was prepped and draped in standard fashion  After adequate time for anesthesia the scope was guided easily along the nasal cavity bilaterally  Bilateral middle meatus and sphenoethmoid recesses were evaluated  Biopsy of the right nasal cavity/ethmoid mass was performed under direct visualization  Posi-Sep-X was placed in the right nasal cavity   The scope was removed without difficulty and the patient tolerated the procedure well        I was present for the entire procedure and A qualified resident physician was not available    Patient Disposition:  PACU  and extubated and stable      SIGNATURE: Rena Alva MD  DATE: April 28, 2022  TIME: 9:21 AM

## 2022-04-28 NOTE — ANESTHESIA PREPROCEDURE EVALUATION
Procedure:  NASAL ENDOSCOPY AND BIOPSY (N/A Neck)    Relevant Problems   CARDIO   (+) Hyperlipidemia   (+) Hypertension      NEURO/PSYCH   (+) Anxiety   (+) Diplopia        Physical Exam    Airway    Mallampati score: III  TM Distance: >3 FB  Neck ROM: full     Dental   upper dentures and lower dentures,     Cardiovascular  Cardiovascular exam normal    Pulmonary  Pulmonary exam normal     Other Findings  Pt edentulous      Anesthesia Plan  ASA Score- 2     Anesthesia Type- general with ASA Monitors  Additional Monitors:   Airway Plan: LMA  Plan Factors-Exercise tolerance (METS): >4 METS  Chart reviewed  Patient summary reviewed  Patient is not a current smoker  Induction- intravenous  Postoperative Plan-     Informed Consent- Anesthetic plan and risks discussed with patient  I personally reviewed this patient with the CRNA  Discussed and agreed on the Anesthesia Plan with the CRNA  Barbie Way

## 2022-04-28 NOTE — DISCHARGE INSTRUCTIONS
Functional Endoscopic Nasal Surgery  Office [phone]  Cell [phone]      WHAT YOU SHOULD KNOW:     AFTER YOU LEAVE:   Medicines:   · Medicines  can help decrease pain or prevent bacterial infections  Ask your healthcare provider how to take prescription pain medicine safely  · Take your medicine as directed  Call your healthcare provider if you think your medicine is not helping or if you have side effects  Tell him if you are allergic to any medicine  Keep a list of the medicines, vitamins, and herbs you take  Include the amounts, and when and why you take them  Bring the list or the pill bottles to follow-up visits  Carry your medicine list with you in case of an emergency  Follow up with your healthcare provider as directed:  Write down your questions so you remember to ask them during your visits  Home care:   · No nose blowing until instructed  · Drink plenty of liquids  Ask your healthcare provider how much liquid to drink each day and which liquids are best for you  Limit the amount of caffeine you drink  · Rinse your nose with salt water  This may help loosen your thick mucous so that it comes out more easily when you blow your nose  Start using the Duyen Dukes Med Sinus rinse the night of surgery and continue to use twice daily until otherwise instructed  · Twice weekly, clean the sinus rinse bottle with hot, soapy water and microwave while wet for 60 seconds  · Use a cool-mist vaporizer or humidifier  to add moisture to the air  This helps thin your nasal discharge and prevent colds  Wash the humidifier each day with soap and warm water to keep it free of germs  · Wash your hands frequently  Wash your hands after you come into contact with a person who has a cold  Germ-killing hand lotion or gel may be used to clean your hands when there is no water available      · Sleep with the head of bed elevated to reduce swelling within the nose        You may use ibuprofen (Motrin, Advil) and acetaminophen (Tyelnol) for pain control in addition to any prescribed pain medications  Nasal packing:  Ask your healthcare provider how long the nasal packing will stay inside your nose  If it needs to be removed and replaced at home, ask your healthcare provider how to properly do this  Contact your healthcare provider if:   · You have a fever  · You have chills, a cough, or feel weak and achy  · You have bruises or swelling around your nose or eyes  · Any vision changes    · You have nausea or vomiting  · Blood soaks through your nasal packing  · Your skin is itchy, swollen, or has a rash  · You have questions or concerns about your condition or care  Seek care immediately or call 911 if:   · You have a stiff neck or eye pain, especially when you look directly at light  · You have a severe headache that does not go away even after you take pain medicine  · You have clear fluid coming from your nose  · You have pus or a foul-smelling odor coming from your nose  · You have trouble breathing, seeing, talking, or thinking clearly  · Your face is getting numb  · Your symptoms come back or become worse

## 2022-08-03 ENCOUNTER — TELEPHONE (OUTPATIENT)
Dept: FAMILY MEDICINE CLINIC | Facility: CLINIC | Age: 87
End: 2022-08-03

## 2022-08-03 NOTE — TELEPHONE ENCOUNTER
[address] [facility] , [address]       Reason for documentation: Patient was flagged by Third Party Payer and/or internal report as being due for a refill on the following medications:    Atorvastatin 40 mg; pharmacy fill rate 0 69% (goal > 80%); refill due date 07/07/22      Outcome:  Patient's son, Juice Guerra, answered her phone  Informed her son that she has a refill for her Atorvastatin ready to be picked up

## 2022-09-21 ENCOUNTER — TELEPHONE (OUTPATIENT)
Dept: FAMILY MEDICINE CLINIC | Facility: CLINIC | Age: 87
End: 2022-09-21

## 2022-09-21 NOTE — TELEPHONE ENCOUNTER
[facility] called in to inform [facility] of the patient passing, stated patient went peacefully on the nght of 09/20/2022

## (undated) DEVICE — SPLINT INTRANASAL 0.6 X 2 IN POSISEP X

## (undated) DEVICE — STERILE BETHLEHEM NASAL PACK: Brand: CARDINAL HEALTH

## (undated) DEVICE — GLOVE SRG BIOGEL 7.5

## (undated) DEVICE — BLADE 1884006HR RAD40 5PK M4 4MM ROTATE: Brand: RAD

## (undated) DEVICE — SYRINGE 10ML LL CONTROL TOP

## (undated) DEVICE — GLOVE INDICATOR PI UNDERGLOVE SZ 7.5 BLUE

## (undated) DEVICE — TUBING SUCTION 5MM X 12 FT

## (undated) DEVICE — BLADE 1884004HR TRICUT 5PK M4 4MM ROTATE: Brand: TRICUT

## (undated) DEVICE — NEEDLE SPINAL 22G X 3.5IN  QUINCKE

## (undated) DEVICE — ANTI-FOG SOLUTION WITH FOAM PAD: Brand: DEVON

## (undated) DEVICE — NEURO PATTIES 1/2 X 3

## (undated) DEVICE — SHEATH 1912010 5PK 4MM/30DEG STORZ XOMED: Brand: ENDO-SCRUB®

## (undated) DEVICE — MAYO STAND COVER: Brand: CONVERTORS

## (undated) DEVICE — NEEDLE 25G X 1 1/2

## (undated) DEVICE — SHEATH 1912000 5PK 4MM/0DEG STORZ XOMED: Brand: ENDO-SCRUB®